# Patient Record
Sex: MALE | URBAN - METROPOLITAN AREA
[De-identification: names, ages, dates, MRNs, and addresses within clinical notes are randomized per-mention and may not be internally consistent; named-entity substitution may affect disease eponyms.]

---

## 2022-08-03 ENCOUNTER — OFFICE VISIT (OUTPATIENT)
Dept: URBAN - METROPOLITAN AREA CLINIC 111 | Facility: CLINIC | Age: 37
End: 2022-08-03
Payer: COMMERCIAL

## 2022-08-03 ENCOUNTER — DASHBOARD ENCOUNTERS (OUTPATIENT)
Age: 37
End: 2022-08-03

## 2022-08-03 VITALS
WEIGHT: 188.8 LBS | HEART RATE: 56 BPM | SYSTOLIC BLOOD PRESSURE: 126 MMHG | DIASTOLIC BLOOD PRESSURE: 86 MMHG | HEIGHT: 69 IN | TEMPERATURE: 97.9 F | BODY MASS INDEX: 27.96 KG/M2

## 2022-08-03 DIAGNOSIS — R58 BLOOD ON TOILET PAPER: ICD-10-CM

## 2022-08-03 DIAGNOSIS — K64.8 INTERNAL HEMORRHOID: ICD-10-CM

## 2022-08-03 DIAGNOSIS — K64.4 EXTERNAL HEMORRHOID: ICD-10-CM

## 2022-08-03 DIAGNOSIS — K62.89 RECTAL PAIN: ICD-10-CM

## 2022-08-03 PROBLEM — 35298007: Status: ACTIVE | Noted: 2022-08-03

## 2022-08-03 PROCEDURE — 99204 OFFICE O/P NEW MOD 45 MIN: CPT | Performed by: INTERNAL MEDICINE

## 2022-08-03 RX ORDER — HYDROCORTISONE ACETATE 25 MG/1
1 SUPPOSITORY SUPPOSITORY RECTAL TWICE A DAY
Qty: 12 | Refills: 1 | OUTPATIENT
Start: 2022-08-03 | End: 2022-08-15

## 2022-08-03 RX ORDER — NIFEDIPINE
PEA SIZE AMOUNT AS NEEDED POWDER (GRAM) MISCELLANEOUS
Qty: 30 | Refills: 1 | OUTPATIENT

## 2022-08-03 NOTE — PHYSICAL EXAM GASTROINTESTINAL
soft, nontender, nondistended, no rebound or guarding, non bleeding, non tender small external hemorrhoid noted.

## 2022-08-03 NOTE — HPI-TODAY'S VISIT:
36 yo male patient presents for rectal pain and blood in stool. Reports long history of intermittent rectal pain and blood in stool. However, symptoms has worsened last 2 weeks. Using otc hemorrhoid cream and numbing medication without relief. Colonoscopy in 2014 revealed internal hemorrhoid otherwise normal. He used to have flare up of symptoms when he is constipated, takes MiraLAX with relief. Reports worsened rectal pain even though he is not constipated especially when sitting and after bowel movement. No acid reflux sxs. Denies fh gi malignancy. No prior difficulty with anesthesia or colonoscopy. Sees pcp regularly for RHCM. No hx anemia.

## 2022-09-27 ENCOUNTER — TELEPHONE ENCOUNTER (OUTPATIENT)
Dept: URBAN - METROPOLITAN AREA CLINIC 111 | Facility: CLINIC | Age: 37
End: 2022-09-27